# Patient Record
(demographics unavailable — no encounter records)

---

## 2024-12-04 NOTE — HISTORY OF PRESENT ILLNESS
[FreeTextEntry1] : f/u spots  [de-identified] : HUANG GUPTA is a 47 year old male presenting for:  #Spots on body - brown bumps on back x months-years, asymptomatic, never treated. not changing/growing  Personal hx of skin cancer: no FHx of skin cancer: 2 sisters with BCCs SHx: XRay tech at [employer]

## 2024-12-04 NOTE — PHYSICAL EXAM
[Alert] : alert [Oriented x 3] : ~L oriented x 3 [Well Nourished] : well nourished [Conjunctiva Non-injected] : conjunctiva non-injected [No Visual Lymphadenopathy] : no visual  lymphadenopathy [No Clubbing] : no clubbing [No Edema] : no edema [No Bromhidrosis] : no bromhidrosis [No Chromhidrosis] : no chromhidrosis [Full Body Skin Exam Performed] : performed [FreeTextEntry3] : alert, oriented x 3, well nourished, conjunctiva non-injected, no visual lymphadenopathy, no clubbing, no edema, no bromhidrosis and no chromhidrosis.  Full body skin exam was performed.  General: Alert and oriented, in NAD.  All of the following were examined and were within normal limits, except as noted:  Scalp: Face, including eyelids, nose, lips, ears, oropharynx: Neck: Chest/Back/Abdomen: Bilateral Arms/Hands: Bilateral Legs/Feet: Buttocks, Genitalia, Anus/perineum: 	 Hair, Nails, Oral Mucosa, Eyes:   firm brown papule R lateral lower torso uniform scattered brown macules and papules over trunk and extremities  stuck on brown papules over trunk and extremities

## 2024-12-04 NOTE — ASSESSMENT
[FreeTextEntry1] : # Seborrheic keratoses - These growths are benign - Related to genetics - these lesions run in families; NOT related to sun exposure - No treatment warranted unless inflamed; can use OTC Sarna lotion PRN itch  # Dermatofibroma - Benign diagnosis discussed - it is a ball of scar tissue underneath the skin in response to prior injury or trauma (eg, bug bite) - No further treatment needed at this time  # Screening exam for skin cancer # Multiple benign melanocytic nevi - TBSE performed today - Advised sun protection, SPF 30 or higher daily and reapply often, sun protective clothing - Counseled patient to monitor for changes, including ABCDEs of mole monitoring - Discussed self-skin exams - rtc q1yr for repeat skin exam or sooner if new/concerning lesion